# Patient Record
(demographics unavailable — no encounter records)

---
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
## 2024-12-31 NOTE — ASSESSMENT
[FreeTextEntry1] : 53 yo F with PMH of kidney stone, anxiety, memory complaints and seizures. She has had few breakthrough seizures during the past year with increasing complaints of memory and anxiety related problems. She was admitted to EMU for further characterization and medication management and was switched to Zonisamide from Topiramate. She is doing well on Zonisamide with good compliance and tolerating well.  Plan: 1. Continue Zonisamide 300mg nightly 2. renal US to f/u on h/o stones 3. check labs  4. RTC 6 months.  I have spent 30 minutes or longer reviewing patient data or discussing with the patient the cause of seizures or seizure-like events and comorbid conditions, assessing the risk of recurrence, educating the patient or family to recognize seizures, discussing possible treatment options for seizures and comorbid conditions and documenting encounter and plan. More than 50% of time spent counseling and educating patient about epilepsy including safety issues, AED side effects and interactions, alcohol consumption, sleep deprivation, risks and driving privileges associated with the [facility].

## 2025-07-01 NOTE — DISCUSSION/SUMMARY
[de-identified] : Reviewed all X Ray images with patient today and interpretation was provided. Patient was given prescription of formal physical therapy for strengthening and stretching. Rx Medrol Dosepak sent to patient pharmacy. Rx Celebrex sent to patient pharmacy. Advised patient to complete Medrol Dosepak and then start Celebrex after series completion. Patient will follow up in 6 weeks with MRI report and disc.     ----------------------------------------------- Home Exercise The patient is instructed on a home exercise program.  PK MURILLO Acting as a Scribe for Tim SANTIAGO I, Pk Murillo, attest that this documentation has been prepared under the direction and in the presence of Provider Tim SANTIAGO.  Activity Modification The patient was advised to modify their activities.  Dx / Natural History The patient was advised of the diagnosis. The natural history of the pathology was explained in full to the patient in layman's terms. Several different treatment options were discussed and explained in full to the patient including the risks and benefits of both surgical and non-surgical treatments.  All questions and concerns were answered.  Pain Guide Activities The patient was advised to let pain guide the gradual advancement of activities.  RICE I explained to the patient that rest, ice, compression, and elevation would benefit them. They may return to activity after follow-up or when they no longer have any pain.  The patient's current medication management of their orthopedic diagnosis was reviewed today: (1) We discussed a comprehensive treatment plan that included possible pharmaceutical management involving the use of prescription strength medications including but not limited to options such as oral Naprosyn 500mg BID, once daily Meloxicam 15 mg, or 500-650 mg Tylenol versus over the counter oral medications and topical prescription NSAID Pennsaid vs over the counter Voltaren gel. (2) There is a moderate risk of morbidity with further treatment, especially from use of prescription strength medications and possible side effects of these medications which include upset stomach with oral medications, skin reactions to topical medications and cardiac/renal issues with long term use. (3) I recommended that the patient follow-up with their medical physician to discuss any significant specific potential issues with long term medication use such as interactions with current medications or with exacerbation of underlying medical comorbidities. (4) The benefits and risks associated with use of injectable, oral or topical, prescription and over the counter anti-inflammatory medications were discussed with the patient. The patient voiced understanding of the risks including but not limited to bleeding, stroke, kidney dysfunction, heart disease, and were referred to the black box warning label for further information.

## 2025-07-01 NOTE — PHYSICAL EXAM
[de-identified] : Neurologic: normal mood and affect, orientated and able to communicate Constitutional: well developed and well nourished  Left Knee: full ROM with crepitus Medial patellar facet tenderness Lateral patellar facet tenderness Medial joint line tenderness

## 2025-07-01 NOTE — ADDENDUM
[FreeTextEntry1] : Documented by Pk Guillermo acting as a scribe for Alverto Dumont PA-C on 06/30/2025 and was presence for the following sections: Physical Exam; Data Reviewed; Assessment; Discussion/Summary. All medical record entries made by the Scribe were at my, Alverto Dumont, direction and personally dictated by me on 06/30/2025. I have reviewed the chart and agree that the record accurately reflects my personal performance of the history, physical exam, procedure and imaging.

## 2025-07-01 NOTE — DATA REVIEWED
[FreeTextEntry1] : 6/30/25 OC X-RAY Left Knee 4 views: grade 3 medial and patellofemoral OA, patella seema

## 2025-07-01 NOTE — HISTORY OF PRESENT ILLNESS
[de-identified] :  The patient is a 54 year old F, RHD, who presents today complaining of LEFT knee Date of Injury/Onset: 2 months ago Pain: At Rest:  7/10 With Activity:   9/10 Mechanism of injury: NKI, patient reports recently moved and feels it may have happened at that time. This is NOT a Work Related Injury being treated under Worker's Compensation. This is NOT an athletic injury occurring associated with an interscholastic or organized sports team. Quality of symptoms: dull, ache, pulling  Improves with: heat, cold , Tylenol, Aleve Worse with: stairs, walking Prior treatment: [facility] 6/11/25 and [facility] where they performed an MRI of her left knee , she admits being diagnosed with meniscus tear.  Prior Imaging: x-rays and MRI, images not available.  Out of work/sport: no School/Sport/Position/Occupation: Veterans coordinator Additional Information: none